# Patient Record
Sex: MALE | Race: WHITE | NOT HISPANIC OR LATINO | URBAN - METROPOLITAN AREA
[De-identification: names, ages, dates, MRNs, and addresses within clinical notes are randomized per-mention and may not be internally consistent; named-entity substitution may affect disease eponyms.]

---

## 2025-02-02 ENCOUNTER — OFFICE VISIT (OUTPATIENT)
Dept: URGENT CARE | Age: 50
End: 2025-02-02
Payer: OTHER GOVERNMENT

## 2025-02-02 VITALS
RESPIRATION RATE: 18 BRPM | SYSTOLIC BLOOD PRESSURE: 129 MMHG | TEMPERATURE: 98 F | HEART RATE: 89 BPM | DIASTOLIC BLOOD PRESSURE: 80 MMHG | OXYGEN SATURATION: 98 %

## 2025-02-02 DIAGNOSIS — H01.004 BLEPHARITIS OF LEFT UPPER EYELID, UNSPECIFIED TYPE: Primary | ICD-10-CM

## 2025-02-02 PROCEDURE — 99203 OFFICE O/P NEW LOW 30 MIN: CPT | Performed by: NURSE PRACTITIONER

## 2025-02-02 RX ORDER — ERYTHROMYCIN 5 MG/G
OINTMENT OPHTHALMIC NIGHTLY
Qty: 3.5 G | Refills: 0 | Status: SHIPPED | OUTPATIENT
Start: 2025-02-02 | End: 2025-02-12

## 2025-02-02 ASSESSMENT — ENCOUNTER SYMPTOMS
ARTHRALGIAS: 0
MYALGIAS: 0
FACIAL SWELLING: 0
DOUBLE VISION: 0
APPETITE CHANGE: 0
PERI-ORBITAL EDEMA: 0
NAUSEA: 0
EYE PAIN: 0
FATIGUE: 0
PALPITATIONS: 0
COUGH: 0
SORE THROAT: 0
EYE DISCHARGE: 1
EYE REDNESS: 1
EYE ITCHING: 0
FEVER: 0
ABDOMINAL PAIN: 0
WEAKNESS: 0
DIFFICULTY URINATING: 0
CRUSTING: 1
PHOTOPHOBIA: 0
WOUND: 0
BLURRED VISION: 0
EYE WATERING: 0
SHORTNESS OF BREATH: 0
CHILLS: 0
SINUS PAIN: 0
RHINORRHEA: 0
CONSTIPATION: 0
DIZZINESS: 0
CHEST TIGHTNESS: 0
VOMITING: 0
TINGLING: 0
JOINT SWELLING: 0
BACK PAIN: 0
NUMBNESS: 0
DIARRHEA: 0
BLIND SPOTS: 0
HEADACHES: 0
EYE INFLAMMATION: 0
SINUS PRESSURE: 0

## 2025-02-02 ASSESSMENT — VISUAL ACUITY: OU: 1

## 2025-02-02 NOTE — PROGRESS NOTES
"Subjective   Patient ID: Chencho Carlin \"Naveen\" is a 49 y.o. male. They present today with a chief complaint of Eye Problem (X 2 days. Red and irritated left eye ).    History of Present Illness  Patient is a 50yo male who presents with a red and irritated left eye x 2 days.       Eye Problem  Location:  Left eye  Onset quality:  Sudden  Duration:  2 days  Timing:  Constant  Progression:  Worsening  Chronicity:  New  Context: not burn, not chemical exposure, not contact lens problem, not direct trauma, not foreign body, not using machinery, not scratch, not smoke exposure and not UV exposure    Relieved by:  Nothing  Worsened by:  Nothing  Ineffective treatments:  None tried  Associated symptoms: crusting, discharge and redness    Associated symptoms: no blurred vision, no decreased vision, no double vision, no facial rash, no headaches, no inflammation, no itching, no nausea, no numbness, no photophobia, no scotomas, no swelling, no tearing, no tingling, no vomiting and no weakness        Past Medical History  Allergies as of 02/02/2025    (No Known Allergies)       (Not in a hospital admission)       Past Medical History:   Diagnosis Date    Personal history of other specified conditions     History of heartburn       Past Surgical History:   Procedure Laterality Date    APPENDECTOMY  06/17/2013    Appendectomy        reports that he has been smoking cigarettes. He does not have any smokeless tobacco history on file.    Review of Systems  Review of Systems   Constitutional:  Negative for appetite change, chills, fatigue and fever.   HENT:  Negative for congestion, dental problem, drooling, ear discharge, ear pain, facial swelling, hearing loss, mouth sores, postnasal drip, rhinorrhea, sinus pressure, sinus pain, sneezing and sore throat.    Eyes:  Positive for discharge and redness. Negative for blurred vision, double vision, photophobia, pain, itching and visual disturbance.   Respiratory:  Negative for cough, " chest tightness and shortness of breath.    Cardiovascular:  Negative for chest pain and palpitations.   Gastrointestinal:  Negative for abdominal pain, constipation, diarrhea, nausea and vomiting.   Genitourinary:  Negative for difficulty urinating.   Musculoskeletal:  Negative for arthralgias, back pain, gait problem, joint swelling and myalgias.   Skin:  Negative for rash and wound.   Neurological:  Negative for dizziness, tingling, weakness, numbness and headaches.   Psychiatric/Behavioral:  Negative for self-injury and suicidal ideas.                                   Objective    Vitals:    02/02/25 1306   BP: 129/80   Pulse: 89   Resp: 18   Temp: 36.7 °C (98 °F)   SpO2: 98%     No LMP for male patient.    Physical Exam  Vitals reviewed.   Constitutional:       General: He is awake. He is not in acute distress.     Appearance: Normal appearance. He is well-developed and well-groomed. He is not ill-appearing.   HENT:      Head: Normocephalic and atraumatic.      Right Ear: Hearing, tympanic membrane, ear canal and external ear normal.      Left Ear: Hearing, tympanic membrane, ear canal and external ear normal.      Nose: No nasal deformity, signs of injury, congestion or rhinorrhea.      Mouth/Throat:      Lips: Pink.      Mouth: Mucous membranes are moist. No injury.      Dentition: Normal dentition.      Pharynx: Oropharynx is clear. Uvula midline. No pharyngeal swelling, oropharyngeal exudate, posterior oropharyngeal erythema, uvula swelling or postnasal drip.      Tonsils: No tonsillar exudate.   Eyes:      General: Lids are normal. Vision grossly intact. Gaze aligned appropriately. No allergic shiner, visual field deficit or scleral icterus.        Right eye: No foreign body, discharge or hordeolum.         Left eye: Discharge present.No foreign body or hordeolum.      Extraocular Movements: Extraocular movements intact.      Conjunctiva/sclera:      Right eye: Right conjunctiva is not injected.      Left  eye: Left conjunctiva is injected. No chemosis, exudate or hemorrhage.     Pupils: Pupils are equal, round, and reactive to light.     Cardiovascular:      Rate and Rhythm: Normal rate and regular rhythm.      Heart sounds: Normal heart sounds, S1 normal and S2 normal. Heart sounds not distant. No murmur heard.     No friction rub. No gallop.   Pulmonary:      Effort: Pulmonary effort is normal. No tachypnea, bradypnea, accessory muscle usage, prolonged expiration, respiratory distress or retractions.      Breath sounds: Normal breath sounds and air entry. No stridor, decreased air movement or transmitted upper airway sounds. No decreased breath sounds, wheezing, rhonchi or rales.   Lymphadenopathy:      Head:      Right side of head: No submental, submandibular, tonsillar, preauricular, posterior auricular or occipital adenopathy.      Left side of head: No submental, submandibular, tonsillar, preauricular, posterior auricular or occipital adenopathy.      Cervical:      Right cervical: No superficial cervical adenopathy.     Left cervical: No superficial cervical adenopathy.   Skin:     General: Skin is warm and dry.      Capillary Refill: Capillary refill takes less than 2 seconds.   Neurological:      General: No focal deficit present.      Mental Status: He is alert.      Gait: Gait is intact.   Psychiatric:         Attention and Perception: Attention and perception normal.         Mood and Affect: Mood and affect normal.         Speech: Speech normal.         Behavior: Behavior normal. Behavior is cooperative.         Procedures    Point of Care Test & Imaging Results from this visit  No results found for this visit on 02/02/25.   No results found.    Diagnostic study results (if any) were reviewed by WASHINGTON Perez.    Assessment/Plan   Allergies, medications, history, and pertinent labs/EKGs/Imaging reviewed by WASHINGTON Perez.     Medical Decision Making  Physical exam  consistent with blepharitis and conjunctivitis. Denies eye pain or changes in vision. Will treat with erythromycin ointment. Patient advised to seek further evaluation with any worsening symptoms.     Risks, benefits, and alternatives of the medications and treatment plan prescribed today were discussed, and patient expressed understanding. Plan follow up as discussed or as needed if any worsening symptoms or change in condition. Reinforced red flags including (but not limited to): severe or worsening abdominal pain; difficulty swallowing; stiff neck; shortness of breath; coughing or vomiting blood; chest pain; and new or increased fever are indications to go to the Emergency Department.    The patient voices understanding of all medications. No barriers to adherence. Patient is taking all medications as prescribed and tolerating well. For any new medications, the patient was instructed of directions for and consequences of not taking medication and they were informed about the potential side effects and drug interactions. The after-visit summary was given to the patient and care instructions were reviewed with the patient. All questions were answered and the patient verbalized understanding of the plan of care for today.    Orders and Diagnoses  Diagnoses and all orders for this visit:  Blepharitis of left upper eyelid, unspecified type  -     erythromycin (Romycin) 5 mg/gram (0.5 %) ophthalmic ointment; Apply to left eye once daily at bedtime for 10 days. Apply Amount per Dose: 0.5 inch (~1 cm) per dose.      Medical Admin Record      Patient disposition: Home    Electronically signed by UDAY Perez-CHHAYA  1:37 PM